# Patient Record
Sex: FEMALE | Race: OTHER | HISPANIC OR LATINO | URBAN - METROPOLITAN AREA
[De-identification: names, ages, dates, MRNs, and addresses within clinical notes are randomized per-mention and may not be internally consistent; named-entity substitution may affect disease eponyms.]

---

## 2017-10-16 ENCOUNTER — APPOINTMENT (RX ONLY)
Dept: URBAN - METROPOLITAN AREA CLINIC 46 | Facility: CLINIC | Age: 27
Setting detail: DERMATOLOGY
End: 2017-10-16

## 2017-10-16 DIAGNOSIS — Z41.9 ENCOUNTER FOR PROCEDURE FOR PURPOSES OTHER THAN REMEDYING HEALTH STATE, UNSPECIFIED: ICD-10-CM

## 2017-10-16 PROCEDURE — ? LASER RESURFACING

## 2017-10-16 ASSESSMENT — LOCATION DETAILED DESCRIPTION DERM: LOCATION DETAILED: LEFT ANTERIOR PROXIMAL UPPER ARM

## 2017-10-16 ASSESSMENT — LOCATION ZONE DERM: LOCATION ZONE: ARM

## 2017-10-16 ASSESSMENT — LOCATION SIMPLE DESCRIPTION DERM: LOCATION SIMPLE: LEFT UPPER ARM

## 2017-10-16 NOTE — PROCEDURE: LASER RESURFACING
Detail Level: Generalized
Topical Anesthesia?: BLT cream (benzocaine 20%, lidocaine 6%, tetracaine 4%)
Treatment Number: 1
Length Of Topical Anesthesia Application (Optional): 30 minutes
External Cooling Fan Speed: 4
External Cooling: Miguel Cryo 5
Anesthesia Volume In Cc: 0
Post-Care Instructions: I reviewed with the patient in detail post-care instructions. Patient should avoid sun until area fully healed. Pt should apply aquaphor to treated areas. Written post instructions given to patient. aquaphor applied. Patient tolerated procedure well.
Percent Coverage Per Pass (%): [address] [facility]
Consent: Written consent obtained and before photos taken. risks reviewed including but not limited to crusting, scabbing, blistering, scarring, darker or lighter pigmentary change, incomplete improvement of dyschromia, wrinkles, prolonged erythema and facial swelling, infection and bleeding. Cleaned patients skin with an alcohol prep pad and let air dry. Before photos taken. \\nPatients verbalized understanding of the increased risk of Hyperpigmetation.
Wavelength: 10,600nm
Power ([place]abry): [address] [facility]
Laser Type: CO2 laser

## 2018-02-28 ENCOUNTER — APPOINTMENT (RX ONLY)
Dept: URBAN - METROPOLITAN AREA CLINIC 46 | Facility: CLINIC | Age: 28
Setting detail: DERMATOLOGY
End: 2018-02-28

## 2018-02-28 DIAGNOSIS — Z41.9 ENCOUNTER FOR PROCEDURE FOR PURPOSES OTHER THAN REMEDYING HEALTH STATE, UNSPECIFIED: ICD-10-CM

## 2018-02-28 PROCEDURE — ? LASER RESURFACING

## 2018-02-28 ASSESSMENT — LOCATION SIMPLE DESCRIPTION DERM: LOCATION SIMPLE: LEFT POSTERIOR UPPER ARM

## 2018-02-28 ASSESSMENT — LOCATION ZONE DERM: LOCATION ZONE: ARM

## 2018-02-28 ASSESSMENT — LOCATION DETAILED DESCRIPTION DERM: LOCATION DETAILED: LEFT PROXIMAL POSTERIOR UPPER ARM

## 2018-02-28 NOTE — PROCEDURE: LASER RESURFACING
Laser Type: CO2 laser
Energy(Mj): 0
Number Of Passes: 1
Power (Mabry): 21
External Cooling Fan Speed: 4
Percent Coverage Per Pass (%): [address] [facility]
Detail Level: Generalized
Post-Care Instructions: I reviewed with the patient in detail post-care instructions. Patient should avoid sun until area fully healed. Pt should apply aquaphor to treated areas. Written post instructions given to patient. aquaphor applied. Patient tolerated procedure well.
External Cooling: Miguel Cryo 5
Consent: Written consent obtained and before photos taken. risks reviewed including but not limited to crusting, scabbing, blistering, scarring, darker or lighter pigmentary change, incomplete improvement of dyschromia, wrinkles, prolonged erythema and facial swelling, infection and bleeding. Cleaned patients skin with an alcohol prep pad and let air dry. Before photos taken. \\nPatients verbalized understanding of the increased risk of Hyperpigmetation.
Treatment Number: 2
Wavelength: 10,600nm

## 2021-09-29 ENCOUNTER — APPOINTMENT (RX ONLY)
Dept: URBAN - METROPOLITAN AREA CLINIC 46 | Facility: CLINIC | Age: 31
Setting detail: DERMATOLOGY
End: 2021-09-29

## 2021-09-29 DIAGNOSIS — Z41.9 ENCOUNTER FOR PROCEDURE FOR PURPOSES OTHER THAN REMEDYING HEALTH STATE, UNSPECIFIED: ICD-10-CM

## 2021-09-29 PROCEDURE — ? COSMETIC CONSULTATION: INMODE MORPHEUS 8

## 2021-09-29 PROCEDURE — ? OTHER

## 2021-09-29 PROCEDURE — ? TCA CHEMICAL PEEL

## 2021-09-29 NOTE — PROCEDURE: TCA CHEMICAL PEEL
Frost (0,1+,2+,3+,4+): 0
Prep: The treated area was degreased with pre-peel cleanser, and vaseline was applied for protection of mucous membranes.
Consent: Prior to the procedure, written consent was obtained and risks were reviewed, including but not limited to: redness, peeling, blistering, pigmentary change, scarring, infection, and pain.
Chemical Peel: 15% TCA
Post-Care Instructions: I reviewed with the patient in detail post-care instructions. Patient should avoid sun exposure and wear sun protection.
Time (Mins): 2
Erythema: mild
Number Of Coats: 3
Detail Level: Zone
Post Peel Care: After the procedure, the treatment area was washed with soap and water, and a post-peel cream was applied. Sun protection and post-care instructions were reviewed with the patient.  Chemical peel done by  Colon Mantel

## 2021-09-29 NOTE — PROCEDURE: MIPS QUALITY
Quality 134: Screening For Clinical Depression And Follow-Up Plan: Documentation stating the patient has had a diagnosis of depression or has had a diagnosis of bipolar disorder
Detail Level: Detailed
Quality 110: Preventive Care And Screening: Influenza Immunization: Influenza immunization was not ordered or administered, reason not given

## 2021-09-29 NOTE — PROCEDURE: OTHER
Other (Free Text): Patient refused to sign or do Morpheus treatment due to having a credit and not wanting to pay more money out of pocket
Detail Level: Zone
Render Risk Assessment In Note?: no
Note Text (......Xxx Chief Complaint.): This diagnosis correlates with the